# Patient Record
(demographics unavailable — no encounter records)

---

## 2024-12-03 NOTE — ASSESSMENT
[FreeTextEntry1] : 55-year-old man with symptomatic PAF.   1) We discussed the benefits, risks, alternatives and limitations to catheter ablation of AF.  All questions answered.  He is agreeable to proceed with ablation 2/6/24.  2) Continue Eliquis 5mg twice a day, including the day of the ablation.  3) Metoprolol to be continued at his discretion.  If stopping, he was encouraged to taper (e.g. taper from twice a day to once a day for several weeks prior to discontinuation.)

## 2024-12-03 NOTE — CARDIOLOGY SUMMARY
[de-identified] : 5/14/24 NSR 3/5/24 SR @ 62 bpm  [de-identified] : TTE 4/3/24 1. Normal left ventricular size and systolic function.  2. Mild symmetric left ventricular hypertrophy.  3. Normal right ventricular size and systolic function.  4. Normal atria.  5. Aortic sclerosis without significant stenosis.  6. Trivial pericardial effusion.  7. No prior echo is available for comparison. [de-identified] : CTa 4/29/24 1.  The calcium score is 0 Agatston units. 2.  There are 4 pulmonary veins; 2 on the left and 2 on the right. 3.  No left atrial appendage or left atrial thrombus. 4.  Normal appearing coronaries

## 2024-12-03 NOTE — HISTORY OF PRESENT ILLNESS
[FreeTextEntry1] : 55 y.o. male with now 6 episodes of AF over 20 years, multiple prior DCCVs.  Recent admission with AF that began evening of Thanksgiving after drinking cold liquid, spontaneous terminated ~36h later (following multiple doses of flecainide, bbl, ccb).  Historically, the longest AF episode was 2.5 weeks. Last DCCV end of Feb 2024 at [facility].    Taking Eliquis 5mg BID and metoprolol succinate 25mg BID following recent admission.   CTA April 2024 showed normal coronaries, normal pulmonary vein anatomy, no LA/AMADO thrombus.    ECG today shows normal sinus rhythm.

## 2024-12-03 NOTE — CARDIOLOGY SUMMARY
[de-identified] : 5/14/24 NSR 3/5/24 SR @ 62 bpm  [de-identified] : TTE 4/3/24 1. Normal left ventricular size and systolic function.  2. Mild symmetric left ventricular hypertrophy.  3. Normal right ventricular size and systolic function.  4. Normal atria.  5. Aortic sclerosis without significant stenosis.  6. Trivial pericardial effusion.  7. No prior echo is available for comparison. [de-identified] : CTa 4/29/24 1.  The calcium score is 0 Agatston units. 2.  There are 4 pulmonary veins; 2 on the left and 2 on the right. 3.  No left atrial appendage or left atrial thrombus. 4.  Normal appearing coronaries

## 2025-03-04 NOTE — ADDENDUM
[FreeTextEntry1] : I, Gricel Augustine, am scribing for and the presence of Dr. Ramirez the following sections: HPI, PMH,Family/social history, ROS, Physical Exam, Assessment / Plan. I, Troy Ramirez, personally performed the services described in the documentation, reviewed the documentation recorded by the scribe in my presence and it accurately and completely records my words and actions.

## 2025-03-04 NOTE — CARDIOLOGY SUMMARY
[de-identified] : 3/4/25: SB 56bpm 5/14/24 NSR 3/5/24 SR @ 62 bpm  [de-identified] : TTE 4/3/24 1. Normal left ventricular size and systolic function.  2. Mild symmetric left ventricular hypertrophy.  3. Normal right ventricular size and systolic function.  4. Normal atria.  5. Aortic sclerosis without significant stenosis.  6. Trivial pericardial effusion.  7. No prior echo is available for comparison. [de-identified] : CTa 4/29/24 1.  The calcium score is 0 Agatston units. 2.  There are 4 pulmonary veins; 2 on the left and 2 on the right. 3.  No left atrial appendage or left atrial thrombus. 4.  Normal appearing coronaries

## 2025-03-04 NOTE — ADDENDUM
[FreeTextEntry1] : I, Gricle Augustine, am scribing for and the presence of Dr. Ramirez the following sections: HPI, PMH,Family/social history, ROS, Physical Exam, Assessment / Plan. I, Troy Ramirez, personally performed the services described in the documentation, reviewed the documentation recorded by the scribe in my presence and it accurately and completely records my words and actions.

## 2025-03-04 NOTE — CARDIOLOGY SUMMARY
[de-identified] : 3/4/25: SB 56bpm 5/14/24 NSR 3/5/24 SR @ 62 bpm  [de-identified] : TTE 4/3/24 1. Normal left ventricular size and systolic function.  2. Mild symmetric left ventricular hypertrophy.  3. Normal right ventricular size and systolic function.  4. Normal atria.  5. Aortic sclerosis without significant stenosis.  6. Trivial pericardial effusion.  7. No prior echo is available for comparison. [de-identified] : CTa 4/29/24 1.  The calcium score is 0 Agatston units. 2.  There are 4 pulmonary veins; 2 on the left and 2 on the right. 3.  No left atrial appendage or left atrial thrombus. 4.  Normal appearing coronaries

## 2025-03-04 NOTE — HISTORY OF PRESENT ILLNESS
[FreeTextEntry1] : 55 y.o. male with now 6 episodes of AF over 20 years, multiple prior DCCVs.  Recent admission with AF that began evening of Thanksgiving after drinking cold liquid, spontaneous terminated ~36h later (following multiple doses of flecainide, bbl, ccb).  Historically, the longest AF episode was 2.5 weeks. Last DCCV end of Feb 2024 at [facility].    Taking Eliquis 5mg BID and metoprolol succinate 25mg BID following recent admission.   CTA April 2024 showed normal coronaries, normal pulmonary vein anatomy, no LA/AMADO thrombus.    Now s/p PVI via PFA on 2/6/25.    He is feeling well post procedure.  He reports some brief "flip flops" which he has for many years.  No sustained arrhythmia.  ECG today shows SR.